# Patient Record
Sex: FEMALE | Race: WHITE | NOT HISPANIC OR LATINO | Employment: FULL TIME | URBAN - METROPOLITAN AREA
[De-identification: names, ages, dates, MRNs, and addresses within clinical notes are randomized per-mention and may not be internally consistent; named-entity substitution may affect disease eponyms.]

---

## 2019-12-18 ENCOUNTER — OFFICE VISIT (OUTPATIENT)
Dept: URGENT CARE | Facility: URGENT CARE | Age: 24
End: 2019-12-18
Payer: COMMERCIAL

## 2019-12-18 VITALS
TEMPERATURE: 98 F | RESPIRATION RATE: 16 BRPM | HEART RATE: 88 BPM | WEIGHT: 233 LBS | SYSTOLIC BLOOD PRESSURE: 118 MMHG | DIASTOLIC BLOOD PRESSURE: 78 MMHG

## 2019-12-18 DIAGNOSIS — J03.90 TONSILLITIS: ICD-10-CM

## 2019-12-18 DIAGNOSIS — J02.0 STREPTOCOCCAL PHARYNGITIS: ICD-10-CM

## 2019-12-18 DIAGNOSIS — R07.0 THROAT PAIN: Primary | ICD-10-CM

## 2019-12-18 LAB
DEPRECATED S PYO AG THROAT QL EIA: ABNORMAL
SPECIMEN SOURCE: ABNORMAL

## 2019-12-18 PROCEDURE — 99203 OFFICE O/P NEW LOW 30 MIN: CPT | Performed by: FAMILY MEDICINE

## 2019-12-18 PROCEDURE — 87880 STREP A ASSAY W/OPTIC: CPT | Performed by: PHYSICIAN ASSISTANT

## 2019-12-18 RX ORDER — AMOXICILLIN 875 MG
875 TABLET ORAL 2 TIMES DAILY
Qty: 20 TABLET | Refills: 0 | Status: SHIPPED | OUTPATIENT
Start: 2019-12-18 | End: 2020-01-16

## 2019-12-18 NOTE — PROGRESS NOTES
Chief Complaint   Patient presents with     Urgent Care     fever and sorethroat for 2 days         SUBJECTIVE:  Suyapa Pang is a 24 year old female with a chief complaint of sore throat.  Onset of symptoms was 2 day(s) ago.    Course of illness: sudden onset.  Severity moderate  Current and Associated symptoms: sore throat  Treatment measures tried include Tylenol/Ibuprofen.  Predisposing factors include recent illness .  History reviewed. No pertinent family history.    History reviewed. No pertinent past medical history.  No current outpatient medications on file.     Social History     Tobacco Use     Smoking status: Never Smoker     Smokeless tobacco: Never Used   Substance Use Topics     Alcohol use: Not on file       ROS:  10 point ROS of systems including Constitutional, Eyes, Respiratory, Cardiovascular, Gastroenterology, Genitourinary, Integumentary, Muscularskeletal, Psychiatric were all negative except for pertinent positives noted in my HPI         OBJECTIVE:   /78   Pulse 88   Temp 98  F (36.7  C) (Tympanic)   Resp 16   Wt 105.7 kg (233 lb)   GENERAL APPEARANCE: healthy, alert and no distress  EYES: EOMI,  PERRL, conjunctiva clear  HENT: ear canals and TM's normal.  Nose normal.  Pharynx erythematous with some exudate noted.  NECK: supple, non-tender to palpation, no adenopathy noted  RESP: lungs clear to auscultation - no rales, rhonchi or wheezes  CV: regular rates and rhythm, normal S1 S2, no murmur noted  ABDOMEN:  soft, nontender, no HSM or masses and bowel sounds normal  SKIN: no suspicious lesions or rashes    Rapid Strep test is positive    ASSESSMENT:  Suyapa was seen today for urgent care.    Diagnoses and all orders for this visit:    Throat pain  -     Strep, Rapid Screen    Tonsillitis    Streptococcal pharyngitis  -     amoxicillin (AMOXIL) 875 MG tablet; Take 1 tablet (875 mg) by mouth 2 times daily for 10 days        PLAN:   See orders in epic.   Symptomatic treat with  gargles, lozenges, and OTC analgesic as needed. Follow-up with primary clinic if not improving.  Advisement given that patient will be contagious for the next 24-48 hours after antibiotics initiated  Follow up if  symptoms fail to improve or worsens   Pt understood and agreed with plan     Annemarie Rogers MD

## 2020-01-16 ENCOUNTER — ANCILLARY PROCEDURE (OUTPATIENT)
Dept: GENERAL RADIOLOGY | Facility: CLINIC | Age: 25
End: 2020-01-16
Attending: PHYSICIAN ASSISTANT
Payer: COMMERCIAL

## 2020-01-16 ENCOUNTER — OFFICE VISIT (OUTPATIENT)
Dept: URGENT CARE | Facility: URGENT CARE | Age: 25
End: 2020-01-16
Payer: COMMERCIAL

## 2020-01-16 VITALS
OXYGEN SATURATION: 99 % | HEART RATE: 89 BPM | WEIGHT: 236 LBS | RESPIRATION RATE: 18 BRPM | SYSTOLIC BLOOD PRESSURE: 120 MMHG | DIASTOLIC BLOOD PRESSURE: 76 MMHG

## 2020-01-16 DIAGNOSIS — S69.91XA HAND INJURY, RIGHT, INITIAL ENCOUNTER: Primary | ICD-10-CM

## 2020-01-16 DIAGNOSIS — S60.221A CONTUSION OF RIGHT HAND, INITIAL ENCOUNTER: ICD-10-CM

## 2020-01-16 DIAGNOSIS — S69.91XA HAND INJURY, RIGHT, INITIAL ENCOUNTER: ICD-10-CM

## 2020-01-16 PROCEDURE — 73130 X-RAY EXAM OF HAND: CPT | Mod: RT

## 2020-01-16 PROCEDURE — 99214 OFFICE O/P EST MOD 30 MIN: CPT | Performed by: PHYSICIAN ASSISTANT

## 2020-01-16 RX ORDER — IBUPROFEN 800 MG/1
800 TABLET, FILM COATED ORAL EVERY 8 HOURS PRN
Qty: 30 TABLET | Refills: 0 | Status: SHIPPED | OUTPATIENT
Start: 2020-01-16 | End: 2021-01-15

## 2020-01-17 NOTE — PROGRESS NOTES
SUBJECTIVE:  Chief Complaint   Patient presents with     Musculoskeletal Problem     injury to wrist/hand today playing mickey Pang is a 24 year old female presents with a chief complaint of right hand pain and tenderness.  The injury occurred 1 day(s) ago.   The injury happened while at home. How: direct blowimmediate pain.  The patient complained of mild pain  and has had decreased ROM.  Pain exacerbated by movement.  Relieved by rest.  She treated it initially with no therapy. This is the first time this type of injury has occurred to this patient.     PMH  Obesity    ALLERGIES  No Known Allergies     FAMILY HX  Allergies  Depression    Social History     Tobacco Use     Smoking status: Never Smoker     Smokeless tobacco: Never Used   Substance Use Topics     Alcohol use: Not on file       ROS:  CONSTITUTIONAL:NEGATIVE for fever, chills, change in weight  INTEGUMENTARY/SKIN: NEGATIVE for worrisome rashes, moles or lesions  ENT/MOUTH: NEGATIVE for ear, mouth and throat problems  RESP:NEGATIVE for significant cough or SOB  CV: NEGATIVE for chest pain, palpitations or peripheral edema  GI: NEGATIVE for nausea, abdominal pain, heartburn, or change in bowel habits  MUSCULOSKELETAL: POSITIVE  for right tenderness, localized  NEURO: NEGATIVE for weakness, dizziness or paresthesias    EXAM:   /76   Pulse 89   Resp 18   Wt 107 kg (236 lb)   SpO2 99%   Gen: healthy, alert, active and healthy,alert,no distress  Extremity: hand has point tenderness .   There is not compromise to the distal circulation.  Pulses are +2 and CRT is brisk  GENERAL APPEARANCE: healthy, alert and no distress  EXTREMITIES: peripheral pulses normal  MS:  Positive for right dorsal hand tenderness  SKIN: no suspicious lesions or rashes  NEURO: Normal strength and tone, sensory exam grossly normal, mentation intact and speech normal    X-RAY was done and negative for acute changes including fracture Xray read by Michael Mohr,  DEAN at time of visit    ASSESSMENT/PLAN      ICD-10-CM    1. Hand injury, right, initial encounter S69.91XA XR Hand Right G/E 3 Views     ibuprofen (ADVIL/MOTRIN) 800 MG tablet   2. Contusion of right hand, initial encounter S60.221A ibuprofen (ADVIL/MOTRIN) 800 MG tablet       RICE treatment: Rest, Ice, compression, elevation   Motrin for inflammation  Follow up with PCP as needed

## 2020-04-10 ENCOUNTER — VIRTUAL VISIT (OUTPATIENT)
Dept: FAMILY MEDICINE | Facility: OTHER | Age: 25
End: 2020-04-10

## 2020-04-10 ENCOUNTER — ANCILLARY PROCEDURE (OUTPATIENT)
Dept: GENERAL RADIOLOGY | Facility: CLINIC | Age: 25
End: 2020-04-10
Attending: NURSE PRACTITIONER
Payer: COMMERCIAL

## 2020-04-10 ENCOUNTER — NURSE TRIAGE (OUTPATIENT)
Dept: NURSING | Facility: CLINIC | Age: 25
End: 2020-04-10

## 2020-04-10 ENCOUNTER — OFFICE VISIT (OUTPATIENT)
Dept: URGENT CARE | Facility: URGENT CARE | Age: 25
End: 2020-04-10
Payer: COMMERCIAL

## 2020-04-10 VITALS
HEART RATE: 78 BPM | RESPIRATION RATE: 20 BRPM | DIASTOLIC BLOOD PRESSURE: 80 MMHG | WEIGHT: 232 LBS | OXYGEN SATURATION: 99 % | SYSTOLIC BLOOD PRESSURE: 130 MMHG | TEMPERATURE: 98.8 F

## 2020-04-10 DIAGNOSIS — R05.8 COUGH PRESENT FOR GREATER THAN 3 WEEKS: ICD-10-CM

## 2020-04-10 DIAGNOSIS — R05.8 COUGH PRESENT FOR GREATER THAN 3 WEEKS: Primary | ICD-10-CM

## 2020-04-10 DIAGNOSIS — R06.2 WHEEZE: ICD-10-CM

## 2020-04-10 PROCEDURE — 99214 OFFICE O/P EST MOD 30 MIN: CPT | Performed by: NURSE PRACTITIONER

## 2020-04-10 PROCEDURE — 71046 X-RAY EXAM CHEST 2 VIEWS: CPT

## 2020-04-10 RX ORDER — AZITHROMYCIN 250 MG/1
TABLET, FILM COATED ORAL
Qty: 6 TABLET | Refills: 0 | Status: SHIPPED | OUTPATIENT
Start: 2020-04-10 | End: 2020-04-15

## 2020-04-10 RX ORDER — ALBUTEROL SULFATE 90 UG/1
2 AEROSOL, METERED RESPIRATORY (INHALATION) EVERY 4 HOURS PRN
Qty: 1 INHALER | Refills: 0 | Status: SHIPPED | OUTPATIENT
Start: 2020-04-10 | End: 2022-08-18

## 2020-04-10 RX ORDER — CETIRIZINE HYDROCHLORIDE 10 MG/1
10 TABLET ORAL DAILY
COMMUNITY
End: 2022-08-18

## 2020-04-10 ASSESSMENT — ENCOUNTER SYMPTOMS
PALPITATIONS: 0
VOMITING: 0
ABDOMINAL PAIN: 0
CHEST TIGHTNESS: 1
SHORTNESS OF BREATH: 0
WHEEZING: 1
HEADACHES: 1
MYALGIAS: 0
NUMBNESS: 0
RHINORRHEA: 1
LIGHT-HEADEDNESS: 0
SORE THROAT: 0
COUGH: 1
FEVER: 0
NAUSEA: 0
DIZZINESS: 0
DIARRHEA: 0
TROUBLE SWALLOWING: 0

## 2020-04-10 NOTE — TELEPHONE ENCOUNTER
"Patient called in because when she inhales she she is making a low sound like a wheezing.   First noticed this starting a few months ago after a cold. It came back about a month ago and it is every day now that she is having this noise happening with her breathing. Most of the day, all day long she hears the noise and her chest feels a little tight when the noise is really bad. She denies fever, cough, or any shortness of breath. She will do an On[facility] visit and ask for a phone call in order for the \"wheeze\" to be heard.     Azeb Jama RN on 4/10/2020 at 1:27 PM    COVID 19 Nurse Triage Plan/Patient Instructions    Please be aware that novel coronavirus (COVID-19) may be circulating in the community. If you develop symptoms such as fever, cough, or SOB or if you have concerns about the presence of another infection including coronavirus (COVID-19), please contact your health care provider or visit [email or web address].     Disposition/Instructions    Patient to have an On[facility] Visit with a provider (Preferred option). Follow System Ambulatory Workflow for COVID 19.     To do this follow these instructions:    1. Go to the website [email or web address]  2. Create an account (you will need your insurance information)  3. Start a new visit  4. Choose your diagnosis (e.g. COVID19)  5. Fill out the information about your symptoms  6. A provider will reach out to you by text, phone call or video visit based on your request    Call Back If: Your symptoms worsen before you are able to complete your On[facility] Visit with a provider.    Thank you for limiting contact with others, wearing a simple mask to cover your cough, practice good hand hygiene habits and accessing our virtual services where possible to limit the spread of this virus.    For more information about COVID19 and options for caring for yourself at home, please visit the CDC website at https://www.cdc.gov/coronavirus/2019-ncov/about/steps-when-sick.html  For more options " "for care at [facility], please visit our website at [email or web address]    For more information, please use the [place] Department of Health ([facility]) COVID-19 Hotlines (Interpreters available):     Health questions: Phone Number: [phone] or [phone] and Hours: 7 a.m. to 7 p.m.    Schools and  questions: Phone Number: [phone] or [phone] and Hours 7 a.m. to 7 p.m.    Additional Information    Negative: Breathing stopped and hasn't returned    Negative: Choking on something    Negative: SEVERE difficulty breathing (e.g., struggling for each breath, speaks in single words, pulse > 120)    Negative: Bluish (or gray) lips or face    Negative: Difficult to awaken or acting confused (e.g., disoriented, slurred speech)    Negative: Passed out (i.e., fainted, collapsed and was not responding)    Negative: Wheezing started suddenly after medicine, an allergic food, or bee sting    Negative: Stridor    Negative: Slow, shallow and weak breathing    Negative: Sounds like a life-threatening emergency to the triager    Negative: MODERATE difficulty breathing (e.g., speaks in phrases, SOB even at rest, pulse 100-120) of new onset or worse than normal    Negative: Wheezing can be heard across the room    Negative: Drooling or spitting out saliva (because can't swallow)    Negative: Any history of prior \"blood clot\" in leg or lungs    Negative: Recent illness requiring prolonged bedrest (i.e., immobilization)    Negative: Hip or leg fracture in past 2 months (e.g., or had cast on leg or ankle)    Negative: Major surgery in the past month    Negative: Recent long-distance travel with prolonged time in car, bus, plane, or train (i.e., within past 2 weeks; 6 or  more hours duration)    Negative: Extra heart beats OR irregular heart beating   (i.e., \"palpitations\")    Negative: Fever > 103 F (39.4 C)    Negative: Fever > 101 F (38.3 C) and over 60 years of age    " Negative: Fever > 100.0 F (37.8 C) and bedridden (e.g., nursing home patient, stroke, chronic illness, recovering from surgery)    Negative: Fever > 100.0 F (37.8 C) and diabetes mellitus or weak immune system (e.g., HIV positive, cancer chemo, splenectomy, organ transplant, chronic steroids)    Negative: Periods where breathing stops and then resumes normally and bedridden (e.g., nursing home patient, CVA)    Negative: Pregnant or postpartum (< 1 month since delivery)    Negative: Patient sounds very sick or weak to the triager    Patient wants to be seen    Negative: MILD difficulty breathing (e.g., minimal/no SOB at rest, SOB with walking, pulse < 100) of new onset or worse than normal    Negative: Longstanding difficulty breathing (e.g., CHF, COPD, emphysema) and worse than normal    Negative: Longstanding difficulty breathing and not responding to usual therapy    Negative: Continuous (nonstop) coughing    Protocols used: BREATHING DIFFICULTY-A-OH

## 2020-04-10 NOTE — PATIENT INSTRUCTIONS
Zithromax as prescribed  Albuterol every 4 hours as needed for cough, wheeze, or shortness of breath.  Continue with zyrtec daily  Push Fluids  Plenty of rest  Tylenol and ibuprofen to help with pain or fever  Humidified air can help with congestion  Nasal saline or Flonase nasal spray to help with congestion  Salt water gargles, anesthetic throat spray, or lozenges to help with sore throat.

## 2020-04-10 NOTE — PROGRESS NOTES
"SUBJECTIVE:   Suyapa Pang is a 24 year old female presenting with a chief complaint of   Chief Complaint   Patient presents with     Breathing Problem     sx for past few months, started after a cold, states hears a noise with resp,some chest tightness       She is an established patient of [facility].    She states has had a cough for the last 1-2 months. States did have a cold in Nov/Dec and similar thing happened then improved and then got worse again. States the cough is like a throat clearing or like a \"croaking\" feeling. She states she does have some chest tightness and wheezing as well. Denies chest pain or shortness of breath, but states feels as though she cannot breath to her full lung volume or that her body \"forgets to breath.\" She states it does not affect her activities of daily living, but does seem to be worsening. Does have history of seasonal allergies but denies history of asthma.     URI Adult    Onset of symptoms was 2 month(s) ago.  Course of illness is worsening  Severity moderate  Current and Associated symptoms: see ROS  Treatment measures tried include zyrtec.  Predisposing factors include seasonal allergies.    Review of Systems   Constitutional: Negative for fever.   HENT: Positive for congestion and rhinorrhea. Negative for ear pain, sore throat and trouble swallowing.    Eyes: Negative for visual disturbance.   Respiratory: Positive for cough, chest tightness and wheezing. Negative for shortness of breath.    Cardiovascular: Negative for chest pain and palpitations.   Gastrointestinal: Negative for abdominal pain, diarrhea, nausea and vomiting.   Musculoskeletal: Negative for myalgias.   Skin: Negative for rash.   Allergic/Immunologic: Positive for environmental allergies.   Neurological: Positive for headaches. Negative for dizziness, light-headedness and numbness.       History reviewed. No pertinent past medical history.  History reviewed. No pertinent family history.  Current " Outpatient Medications   Medication Sig Dispense Refill     albuterol (PROAIR HFA/PROVENTIL HFA/VENTOLIN HFA) 108 (90 Base) MCG/ACT inhaler Inhale 2 puffs into the lungs every 4 hours as needed for shortness of breath / dyspnea or wheezing 1 Inhaler 0     azithromycin (ZITHROMAX) 250 MG tablet Take 2 tablets (500 mg) by mouth daily for 1 day, THEN 1 tablet (250 mg) daily for 4 days. 6 tablet 0     cetirizine (ZYRTEC) 10 MG tablet Take 10 mg by mouth daily       ibuprofen (ADVIL/MOTRIN) 800 MG tablet Take 1 tablet (800 mg) by mouth every 8 hours as needed 30 tablet 0     Social History     Tobacco Use     Smoking status: Never Smoker     Smokeless tobacco: Never Used   Substance Use Topics     Alcohol use: Not on file       OBJECTIVE  /80 (Cuff Size: Adult Regular)   Pulse 78   Temp 98.8  F (37.1  C) (Oral)   Resp 20   Wt 105.2 kg (232 lb)   SpO2 99%     Physical Exam  Vitals signs and nursing note reviewed.   Constitutional:       Appearance: Normal appearance. She is well-developed and well-groomed.   HENT:      Head: Normocephalic and atraumatic.      Right Ear: Tympanic membrane, ear canal and external ear normal.      Left Ear: Tympanic membrane, ear canal and external ear normal.      Nose: Congestion and rhinorrhea present.      Right Sinus: No maxillary sinus tenderness or frontal sinus tenderness.      Left Sinus: No maxillary sinus tenderness or frontal sinus tenderness.      Mouth/Throat:      Pharynx: Oropharynx is clear. No oropharyngeal exudate or posterior oropharyngeal erythema.      Tonsils: 1+ on the right. 1+ on the left.   Eyes:      Extraocular Movements: Extraocular movements intact.      Conjunctiva/sclera: Conjunctivae normal.      Pupils: Pupils are equal, round, and reactive to light.   Neck:      Musculoskeletal: Normal range of motion and neck supple.   Cardiovascular:      Rate and Rhythm: Normal rate and regular rhythm.      Heart sounds: Normal heart sounds.   Pulmonary:       Effort: Pulmonary effort is normal.      Breath sounds: Normal air entry. Examination of the right-lower field reveals decreased breath sounds. Examination of the left-lower field reveals decreased breath sounds. Decreased breath sounds present.   Lymphadenopathy:      Head:      Right side of head: No submandibular or tonsillar adenopathy.      Left side of head: No submandibular or tonsillar adenopathy.      Cervical: No cervical adenopathy.   Skin:     General: Skin is warm and dry.   Neurological:      Mental Status: She is alert and oriented to person, place, and time.   Psychiatric:         Behavior: Behavior is cooperative.       X-Ray was done, my findings are: no acute cardiopulmonary findings.     ASSESSMENT:      ICD-10-CM    1. Cough present for greater than 3 weeks  R05 XR Chest 2 Views     azithromycin (ZITHROMAX) 250 MG tablet     albuterol (PROAIR HFA/PROVENTIL HFA/VENTOLIN HFA) 108 (90 Base) MCG/ACT inhaler   2. Wheeze  R06.2 albuterol (PROAIR HFA/PROVENTIL HFA/VENTOLIN HFA) 108 (90 Base) MCG/ACT inhaler        Medical Decision Making:    Differential Diagnosis:  URI Adult/Peds:  Allergic rhinitis, Bronchitis-viral, Bronchospasm, Laryngitis, Pneumonia, Tonsilitis and Viral upper respiratory illness    Serious Comorbid Conditions:  Adult:  None    PLAN:  Discussed with patient that will do a CXR given she does have some diminished lung sounds in the bases. CXR shows no acute cardiopulmonary findings. Discussed will treat with zithromax. Will also do albuterol every 4 hours as needed for cough, wheeze, or shortness of breath. Additional symptomatic treatment recommendations discussed. Education was added to AVS. Patient was agreeable to plan and verbalized understanding.     Followup:    If not improving in 1 week or if condition worsens, follow up with your Primary Care Provider    Patient Instructions   Zithromax as prescribed  Albuterol every 4 hours as needed for cough, wheeze, or shortness of  breath.  Continue with zyrtec daily  Push Fluids  Plenty of rest  Tylenol and ibuprofen to help with pain or fever  Humidified air can help with congestion  Nasal saline or Flonase nasal spray to help with congestion  Salt water gargles, anesthetic throat spray, or lozenges to help with sore throat.

## 2020-04-10 NOTE — PROGRESS NOTES
"Date: 04/10/2020 14:34:51  Clinician: Michael Contreras  Clinician NPI: [id number]  Patient: Suyapa Pang  Patient : 1995  Patient Address: [address] [place] [address] [address], [place] [id number]  Patient Phone: [phone]  Visit Protocol: Allergic rhinitis  Patient Summary:  Suyapa is a 24 year old ( : 1995 ) female who initiated a Visit for evaluation of seasonal allergies.  When asked the question \"Please sign me up to receive news, health information and promotions. \", Suyapa responded \"No\".   A synchronous visit is necessary because the patient reported the following abnormal symptoms:   Symptoms noted are not consistent with allergies   She is currently experiencing allergy symptoms. She has NOT been treated by a health care provider in the past for these symptoms. She notes headache and coughing with cough as the most bothersome.  These symptoms have been present for several months.   Suyapa is experiencing mild difficulty breathing with activities but can speak normally in full sentences. Her wheezing is moderate and it doesn't interfere with daily activities.  Dust aggravate these symptoms.   Suyapa has not received allergy shots. The patient's allergy symptoms have NOT been confirmed by skin allergy testing. Family history of allergies is unknown.   Suyapa denies tooth pain, feeling feverish, facial pressure, scratchy throat, plugged ears, having nasal ulcers, a septum deviation or other sinus wounds, runny nose, sneezing, and itchy eyes.   She denies smoking or using smokeless tobacco.   PREVIOUS ALLERGY MEDICATIONS:   Cetirizine (Zyrtec); NOT effective   She denies pregnancy and denies breastfeeding. She has menstruated in the past month.   Additional information as reported by the patient (free text): Wheezing when I breathe in sounds like low croaking.  It has been consistently happening for a month now.  Happened before after a cold a few months ago but it went away for a bit " until a month ago. It happens throughout the day daily. At the worst I have some chest tightness and it feels as if my body forgets how to breathe. At times it doesn't feel like I am using my full lung compacity when I breathe like normal.     MEDICATIONS: Zyrtec oral, ALLERGIES: NKDA  Clinician Response:  Dear Suyapa,   Based on the information you have provided, further evaluation in urgent care is indicated. You may be seen in one of our designated urgent care sites that is prepared to see patients who have symptoms that could indicate Coronavirus (Covid-19).   For your information: At this time we will NOT perform coronavirus testing in urgent care sites. This test is currently being reserved for patients who are being admitted to the hospital.  [facility] Locations: [place], [place], [place], [place], [place], [facility] [place] and [place]  Please call [address] [place] [address] ([phone]) to schedule an urgent care appointment at one of our urgent care or walk in care sites. It is essential that you tell them when you call that you were referred to be scheduled for IN-PERSON urgent care appointment by a provider in [facility].      [facility]: If you usually get care or are located in the [facility] area, you can be seen as a walk in without an appointment at the [facility]. This is open from 8AM-8PM on weekdays and 10am-8pm on weekends. The phone number to this clinic is [phone].     PLEASE BRING DOCUMENTATION FROM THIS COMPLETED [facility]Care VISIT TO YOUR URGENT CARE VISIT.     For more information about COVID19 and options for caring for yourself at home, please visit the CDC website at https://www.cdc.gov/coronavirus/2019-ncov/about/steps-when-sick.html  For more options for care at [facility], please visit our website at [email or web address]    Diagnosis: Cough  Diagnosis ICD: R05  Triage Notes: I reviewed the patient's  "history, verified their identity, and explained the Visit process.    Called to discuss symptoms with patient. She states has had a cough for the last 1-2 months. States did have a cold in Nov/Dec and similar thing happened then improved and then got worse again. States the cough is like a throat clearing or like a \"croaking\" feeling. She states she does have some chest tightness and wheezing as well. Denies chest pain or shortness of breath, but states feels as though she cannot breath to her full lung volume or that her body \"forgets to breath.\" She states it does not affect her activities of daily living, but does seem to be worsening. She states she does still have her tonsils, they do not seem to be inflamed. She denies sore throat or difficulty swallowing. She does get intermittent headaches, but denies dizziness, lightheaded, vision changes. She denies nausea, vomiting, diarrhea, or rash. She does have history of seasonal allergies but denies history of asthma. Patient is able to speak in full sentences without difficulty. Recommend that patient be seen in clinic for evaluation. Instructed to call [phone] to get set up with an IN-Person visit.  Synchronous Triage: phone, status: completed, duration: 386 seconds  "

## 2022-08-18 ENCOUNTER — OFFICE VISIT (OUTPATIENT)
Dept: URGENT CARE | Facility: URGENT CARE | Age: 27
End: 2022-08-18
Payer: COMMERCIAL

## 2022-08-18 VITALS
HEART RATE: 80 BPM | DIASTOLIC BLOOD PRESSURE: 89 MMHG | RESPIRATION RATE: 18 BRPM | TEMPERATURE: 98.1 F | WEIGHT: 233 LBS | OXYGEN SATURATION: 99 % | SYSTOLIC BLOOD PRESSURE: 131 MMHG

## 2022-08-18 DIAGNOSIS — H66.91 OTITIS MEDIA WITH RUPTURE OF TYMPANIC MEMBRANE, RIGHT: ICD-10-CM

## 2022-08-18 DIAGNOSIS — H65.192 OTHER NON-RECURRENT ACUTE NONSUPPURATIVE OTITIS MEDIA OF LEFT EAR: Primary | ICD-10-CM

## 2022-08-18 DIAGNOSIS — H72.91 OTITIS MEDIA WITH RUPTURE OF TYMPANIC MEMBRANE, RIGHT: ICD-10-CM

## 2022-08-18 PROCEDURE — 99213 OFFICE O/P EST LOW 20 MIN: CPT | Performed by: NURSE PRACTITIONER

## 2022-08-18 RX ORDER — NEOMYCIN SULFATE, POLYMYXIN B SULFATE, HYDROCORTISONE 3.5; 10000; 1 MG/ML; [USP'U]/ML; MG/ML
3 SOLUTION/ DROPS AURICULAR (OTIC) 4 TIMES DAILY
Qty: 5 ML | Refills: 0 | Status: SHIPPED | OUTPATIENT
Start: 2022-08-18 | End: 2022-08-25

## 2022-08-18 RX ORDER — AMOXICILLIN 500 MG/1
1000 CAPSULE ORAL 2 TIMES DAILY
Qty: 40 CAPSULE | Refills: 0 | Status: SHIPPED | OUTPATIENT
Start: 2022-08-18 | End: 2022-08-28

## 2022-08-18 NOTE — PROGRESS NOTES
Chief Complaint   Patient presents with     Urgent Care     Intense ear pain, red liquid drainage          ICD-10-CM    1. Other non-recurrent acute nonsuppurative otitis media of left ear  H65.192 amoxicillin (AMOXIL) 500 MG capsule   2. Otitis media with rupture of tympanic membrane, right  H66.91 neomycin-polymyxin-hydrocortisone (CORTISPORIN) 3.5-12703-4 otic solution    H72.91    Will treat the tympanic membrane rupture with antibiotic drops and the otitis media on the other side with amoxicillin.  Recheck in 10 days if symptoms not completely resolved.  Recheck here in 1 month.  No submerging head in water until eardrum has sealed shut and you have been cleared.    Subjective     Suyapa Pang is an 26 year old female who presents to clinic today for right ear pain that started today and then it started draining today. She has been having some nasal congestion for a couple of days.      ROS: 10 point ROS neg other than the symptoms noted above in the HPI.       Objective    /89   Pulse 80   Temp 98.1  F (36.7  C)   Resp 18   Wt 105.7 kg (233 lb)   SpO2 99%   Nurses notes and VS have been reviewed.    Physical Exam       GENERAL APPEARANCE: alert and mild distress     EYES: PERRL, EOMI, sclera non-icteric     HENT: nose and mouth without ulcers or lesions and left ear canal appears normal, tympanic membrane appears red and bulging, right tympanic membrane is red with rupture, canal has thick serosanguineous fluid present     NECK: no adenopathy or asymmetry, thyroid normal to palpation     RESP: lungs clear to auscultation - no rales, rhonchi or wheezes     CV: regular rates and rhythm, no murmurs, rubs, or gallop     MS: extremities normal- no gross deformities noted; normal muscle tone.     SKIN: no suspicious lesions or rashes     NEURO: Normal strength and tone, mentation intact and speech normal     PSYCH: normal thought process; no significant mood disturbance    Patient Instructions   Take  all medications as prescribed.    Recheck eardrum in one month.    No submerging your head in water until rechecked.      JOLENE Ladd, CNP  [facility] Provider    The use of [employer] dictation services may have been used to construct the content in this note; any grammatical or spelling errors are non-intentional. Please contact the author of this note directly if you are in need of any clarification.

## 2022-08-18 NOTE — PATIENT INSTRUCTIONS
Take all medications as prescribed.    Recheck eardrum in one month.    No submerging your head in water until rechecked.

## 2022-08-19 ENCOUNTER — NURSE TRIAGE (OUTPATIENT)
Dept: NURSING | Facility: CLINIC | Age: 27
End: 2022-08-19

## 2022-08-19 NOTE — TELEPHONE ENCOUNTER
Yesterday had ear drum ruptured, double ear infection. Thinks the other one ruptured and hearing is muffled.  Left ear is ruptured as well. Should she put drops in both ears? I explained the volume of the ear drops may not be enough to treat both ears so she should use it in the right ear and take the oral antibiotics which will still work on the infection. As the ear heals, the muffled sound should improve. If not improved in ten days, she should seek out care again, as directed in visit notes.  Katia Griffin RN  [facility]    Reason for Disposition    Caller has medicine question, adult has minor symptoms, caller declines triage, AND triager answers question    Additional Information    Negative: [1] Intentional drug overdose AND [2] suicidal thoughts or ideas    Negative: Drug overdose and triager unable to answer question    Negative: Caller requesting a renewal or refill of a medicine patient is currently taking    Negative: Caller requesting information unrelated to medicine    Negative: Caller requesting information about COVID-19 Vaccine    Negative: Caller requesting information about Emergency Contraception    Negative: Caller requesting information about Combined Birth Control Pills    Negative: Caller requesting information about Progestin Birth Control Pills    Negative: Caller requesting information about Post-Op pain or medicines    Negative: Caller requesting a prescription antibiotic (such as Penicillin) for Strep throat and has a positive culture result    Negative: Caller requesting a prescription anti-viral med (such as Tamiflu) and has influenza (flu) symptoms    Negative: Immunization reaction suspected    Negative: Rash while taking a medicine or within 3 days of stopping it    Negative: [1] Asthma and [2] having symptoms of asthma (cough, wheezing, etc.)    Negative: [1] Symptom of illness (e.g., headache, abdominal pain, earache, vomiting) AND [2] more than mild    Negative:  Breastfeeding questions about mother's medicines and diet    Negative: MORE THAN A DOUBLE DOSE of a prescription or over-the-counter (OTC) drug    Negative: [1] DOUBLE DOSE (an extra dose or lesser amount) of prescription drug AND [2] any symptoms (e.g., dizziness, nausea, pain, sleepiness)    Negative: [1] DOUBLE DOSE (an extra dose or lesser amount) of over-the-counter (OTC) drug AND [2] any symptoms (e.g., dizziness, nausea, pain, sleepiness)    Negative: Took another person's prescription drug    Negative: [1] DOUBLE DOSE (an extra dose or lesser amount) of prescription drug AND [2] NO symptoms (Exception: a double dose of antibiotics)    Negative: Diabetes drug error or overdose (e.g., took wrong type of insulin or took extra dose)    Negative: [1] Prescription not at pharmacy AND [2] was prescribed by PCP recently (Exception: triager has access to EMR and prescription is recorded there. Go to Home Care and confirm for pharmacy.)    Negative: [1] Pharmacy calling with prescription question AND [2] triager unable to answer question    Negative: [1] Caller has URGENT medicine question about med that PCP or specialist prescribed AND [2] triager unable to answer question    Negative: Medicine patch causing local rash or itching    Negative: [1] Caller has medicine question about med NOT prescribed by PCP AND [2] triager unable to answer question (e.g., compatibility with other med, storage)    Negative: Prescription request for new medicine (not a refill)    Negative: [1] Caller has NON-URGENT medicine question about med that PCP prescribed AND [2] triager unable to answer question    Negative: Caller wants to use a complementary or alternative medicine    Negative: [1] Prescription prescribed recently is not at pharmacy AND [2] triager has access to patient's EMR AND [3] prescription is recorded in the EMR    Negative: [1] DOUBLE DOSE (an extra dose or lesser amount) of over-the-counter (OTC) drug AND [2] NO  symptoms    Negative: [1] DOUBLE DOSE (an extra dose or lesser amount) of antibiotic drug AND [2] NO symptoms    Negative: Caller has medicine question only, adult not sick, AND triager answers question    Protocols used: MEDICATION QUESTION CALL-A-AH

## 2022-08-26 ENCOUNTER — TELEPHONE (OUTPATIENT)
Dept: FAMILY MEDICINE | Facility: CLINIC | Age: 27
End: 2022-08-26

## 2022-08-26 ENCOUNTER — OFFICE VISIT (OUTPATIENT)
Dept: FAMILY MEDICINE | Facility: CLINIC | Age: 27
End: 2022-08-26
Payer: COMMERCIAL

## 2022-08-26 VITALS
HEART RATE: 81 BPM | HEIGHT: 67 IN | SYSTOLIC BLOOD PRESSURE: 122 MMHG | RESPIRATION RATE: 16 BRPM | OXYGEN SATURATION: 99 % | WEIGHT: 238 LBS | BODY MASS INDEX: 37.35 KG/M2 | DIASTOLIC BLOOD PRESSURE: 66 MMHG | TEMPERATURE: 98.8 F

## 2022-08-26 DIAGNOSIS — Z23 NEED FOR TDAP VACCINATION: ICD-10-CM

## 2022-08-26 DIAGNOSIS — H65.93 MIDDLE EAR EFFUSION, BILATERAL: Primary | ICD-10-CM

## 2022-08-26 PROCEDURE — 90471 IMMUNIZATION ADMIN: CPT | Performed by: FAMILY MEDICINE

## 2022-08-26 PROCEDURE — 90715 TDAP VACCINE 7 YRS/> IM: CPT | Performed by: FAMILY MEDICINE

## 2022-08-26 PROCEDURE — 99213 OFFICE O/P EST LOW 20 MIN: CPT | Mod: 25 | Performed by: FAMILY MEDICINE

## 2022-08-26 ASSESSMENT — PAIN SCALES - GENERAL: PAINLEVEL: NO PAIN (0)

## 2022-08-26 NOTE — PATIENT INSTRUCTIONS
For your congestion I recommend the followin. Use afrin nasal spray twice a day for up to 3 days. This helps open up the sinuses. Should not be used more than 3 days in a row every once in a while.  2. Use flonase/nasacort nasal spray 1-2x a day during seasons when you are more prone to allergies.  3. Take zyrtec every day during allergy seasons. It works best when you take it regularly. You can take extra (such as 2-3 tablets per day) if needed, when your allergies are worse.  4. Consider getting a neti pot or other nasal wash system (they come in teapot styles and squeeze bottle styles). It's important to always use previously filtered or boiled water (or bottled drinking water) to avoid a very rare but dangerous type of water-borne infection. Follow the instructions that come with the product. You can do this approximately once per day, or however often you need for relief.    Environmental management meaning dust in the home. Textiles, carpets, air filters, etc.

## 2022-08-26 NOTE — TELEPHONE ENCOUNTER
I left a message for her to call us back.     We just noticed that she has an appointment with  regarding an IUD and another appointment right afterwards with  for her physical.      She cannot see 2 different family practice providers in the same day.  Insurance will not cover/allow it.     She will need to choose 1 appointment for today and reschedule the other.      also wants to know when her current IUD was placed, what type she currently has, and when it is due to be removed.  We don't have prior records in her chart regarding IUD's.       Bonnie Barnett CMA 8/26/2022 12:57 PM

## 2022-08-26 NOTE — PROGRESS NOTES
"  Assessment & Plan     Need for Tdap vaccination  given    Middle ear effusion, bilateral  Reassurance given see below    Patient Instructions   For your congestion I recommend the followin. Use afrin nasal spray twice a day for up to 3 days. This helps open up the sinuses. Should not be used more than 3 days in a row every once in a while.  2. Use flonase/nasacort nasal spray 1-2x a day during seasons when you are more prone to allergies.  3. Take zyrtec every day during allergy seasons. It works best when you take it regularly. You can take extra (such as 2-3 tablets per day) if needed, when your allergies are worse.  4. Consider getting a neti pot or other nasal wash system (they come in teapot styles and squeeze bottle styles). It's important to always use previously filtered or boiled water (or bottled drinking water) to avoid a very rare but dangerous type of water-borne infection. Follow the instructions that come with the product. You can do this approximately once per day, or however often you need for relief.    Environmental management meaning dust in the home. Textiles, carpets, air filters, etc.        Return if symptoms worsen or fail to improve.    Zora Babb MD  [facility]   Suyapa is a 26 year old, presenting for the following health issues:  IUD (Patient currently has a Mirena. Placed 5 years ago. )      HPI     Eardrum ruptured last week - right side  Then Thurs night on her L ear had similar symptoms so she would like it checked  But otherwise well    Review of Systems   Constitutional, HEENT, cardiovascular, pulmonary, gi and gu systems are negative, except as otherwise noted.      Objective    /66   Pulse 81   Temp 98.8  F (37.1  C) (Tympanic)   Resp 16   Ht 1.702 m (5' 7\")   Wt 108 kg (238 lb)   SpO2 99%   Breastfeeding No   BMI 37.28 kg/m    Body mass index is 37.28 kg/m .  Physical Exam   GENERAL: healthy, alert and no " distress  EYES: Eyes grossly normal to inspection, conjunctivae and sclerae normal  FACE: non-tender to palpation over maxillary sinuses  HENT: ear canals normal, bilateral middle ear effusions with retracted TMs but no rupture seen, boggy turbinates in nose, and mouth without ulcers or lesions   NECK: no adenopathy, no asymmetry, masses, or scars and thyroid normal to palpation  RESP: normal respiratory effort, speaking in complete sentences  MS: no gross musculoskeletal defects noted, no edema  PSYCH: mentation appears normal, affect normal/bright                .  ..

## 2022-09-18 ENCOUNTER — HEALTH MAINTENANCE LETTER (OUTPATIENT)
Age: 27
End: 2022-09-18

## 2022-09-19 ENCOUNTER — OFFICE VISIT (OUTPATIENT)
Dept: FAMILY MEDICINE | Facility: CLINIC | Age: 27
End: 2022-09-19
Payer: COMMERCIAL

## 2022-09-19 VITALS
HEART RATE: 76 BPM | WEIGHT: 240.8 LBS | RESPIRATION RATE: 16 BRPM | HEIGHT: 67 IN | BODY MASS INDEX: 37.79 KG/M2 | DIASTOLIC BLOOD PRESSURE: 81 MMHG | OXYGEN SATURATION: 97 % | SYSTOLIC BLOOD PRESSURE: 122 MMHG | TEMPERATURE: 96.8 F

## 2022-09-19 DIAGNOSIS — Z86.69 HISTORY OF ACUTE OTITIS MEDIA: Primary | ICD-10-CM

## 2022-09-19 PROCEDURE — 99212 OFFICE O/P EST SF 10 MIN: CPT | Performed by: NURSE PRACTITIONER

## 2022-09-19 ASSESSMENT — PAIN SCALES - GENERAL: PAINLEVEL: NO PAIN (0)

## 2022-09-19 NOTE — PROGRESS NOTES
"  Assessment & Plan   Problem List Items Addressed This Visit    None     Visit Diagnoses     History of acute otitis media    -  Primary           Symptoms resolved. Normal TM exam today     Lemuel Zaman, JOLENE CNP  [facility] NORMA Dale is a 27 year old, presenting for the following health issues:  Ear Problem      HPI     Recent AOM with potential rupture. Here for recheck   No complaints today. Symptoms have resolved       Review of Systems   Detailed as above         Objective    /81 (BP Location: Right arm, Patient Position: Sitting, Cuff Size: Adult Large)   Pulse 76   Temp 96.8  F (36  C) (Temporal)   Resp 16   Ht 1.702 m (5' 7\")   Wt 109.2 kg (240 lb 12.8 oz)   SpO2 97%   Breastfeeding No   BMI 37.71 kg/m    Body mass index is 37.71 kg/m .  Physical Exam  Constitutional:       Appearance: Normal appearance.   HENT:      Right Ear: Tympanic membrane, ear canal and external ear normal.      Left Ear: Tympanic membrane, ear canal and external ear normal.   Pulmonary:      Effort: Pulmonary effort is normal.   Neurological:      Mental Status: She is alert.   Psychiatric:         Mood and Affect: Mood normal.                    "

## 2022-12-07 ENCOUNTER — TELEPHONE (OUTPATIENT)
Dept: FAMILY MEDICINE | Facility: CLINIC | Age: 27
End: 2022-12-07

## 2023-10-08 ENCOUNTER — HEALTH MAINTENANCE LETTER (OUTPATIENT)
Age: 28
End: 2023-10-08

## 2023-11-08 ENCOUNTER — OFFICE VISIT (OUTPATIENT)
Dept: FAMILY MEDICINE | Facility: CLINIC | Age: 28
End: 2023-11-08
Payer: COMMERCIAL

## 2023-11-08 VITALS
OXYGEN SATURATION: 97 % | HEART RATE: 98 BPM | WEIGHT: 254 LBS | DIASTOLIC BLOOD PRESSURE: 68 MMHG | RESPIRATION RATE: 16 BRPM | SYSTOLIC BLOOD PRESSURE: 130 MMHG | HEIGHT: 67 IN | BODY MASS INDEX: 39.87 KG/M2 | TEMPERATURE: 98 F

## 2023-11-08 DIAGNOSIS — Z11.3 ROUTINE SCREENING FOR STI (SEXUALLY TRANSMITTED INFECTION): ICD-10-CM

## 2023-11-08 DIAGNOSIS — Z12.4 CERVICAL CANCER SCREENING: ICD-10-CM

## 2023-11-08 DIAGNOSIS — Z00.00 ROUTINE GENERAL MEDICAL EXAMINATION AT A HEALTH CARE FACILITY: Primary | ICD-10-CM

## 2023-11-08 PROCEDURE — 87491 CHLMYD TRACH DNA AMP PROBE: CPT | Performed by: FAMILY MEDICINE

## 2023-11-08 PROCEDURE — 99395 PREV VISIT EST AGE 18-39: CPT | Performed by: FAMILY MEDICINE

## 2023-11-08 PROCEDURE — 87591 N.GONORRHOEAE DNA AMP PROB: CPT | Performed by: FAMILY MEDICINE

## 2023-11-08 PROCEDURE — G0145 SCR C/V CYTO,THINLAYER,RESCR: HCPCS | Performed by: FAMILY MEDICINE

## 2023-11-08 ASSESSMENT — ENCOUNTER SYMPTOMS
ABDOMINAL PAIN: 0
PALPITATIONS: 0
SHORTNESS OF BREATH: 0
DIARRHEA: 0
NAUSEA: 0
DIZZINESS: 0
CONSTIPATION: 0
DYSURIA: 0
FEVER: 0
JOINT SWELLING: 0
BREAST MASS: 0
SORE THROAT: 0
COUGH: 0
WEAKNESS: 0
MYALGIAS: 0
CHILLS: 0
FREQUENCY: 0
HEARTBURN: 0
EYE PAIN: 0
HEMATOCHEZIA: 0
PARESTHESIAS: 0
HEMATURIA: 0
ARTHRALGIAS: 0
NERVOUS/ANXIOUS: 0
HEADACHES: 0

## 2023-11-08 NOTE — PROGRESS NOTES
SUBJECTIVE:   CC: Suyapa is an 28 year old who presents for preventive health visit.       2023     4:02 PM   Additional Questions   Roomed by Urmila       Healthy Habits:     Getting at least 3 servings of Calcium per day:  Yes    Bi-annual eye exam:  NO    Dental care twice a year:  Yes    Sleep apnea or symptoms of sleep apnea:  None    Diet:  Regular (no restrictions)    Frequency of exercise:  2-3 days/week    Duration of exercise:  15-30 minutes    Taking medications regularly:  Yes    Medication side effects:  None    Additional concerns today:  No      Today's PHQ-2 Score:       2023     3:51 PM   PHQ-2 (  [employer])   Q1: Little interest or pleasure in doing things 0   Q2: Feeling down, depressed or hopeless 0   PHQ-2 Score 0   Q1: Little interest or pleasure in doing things Not at all   Q2: Feeling down, depressed or hopeless Not at all   PHQ-2 Score 0     Concussion in August   Hit a wood panel in on a wall   Doesn't know if she lost consciousness   Felt poor for a couple weeks  No symptoms now           Have you ever done Advance Care Planning? (For example, a Health Directive, POLST, or a discussion with a medical provider or your loved ones about your wishes): No, advance care planning information given to patient to review.  Patient plans to discuss their wishes with loved ones or provider.      Social History     Tobacco Use    Smoking status: Never    Smokeless tobacco: Never   Substance Use Topics    Alcohol use: Yes     Comment: rare             2023     3:51 PM   Alcohol Use   Prescreen: >3 drinks/day or >7 drinks/week? No          No data to display              Reviewed orders with patient.  Reviewed health maintenance and updated orders accordingly - Yes    Breast Cancer Screenin/8/2023     3:53 PM   Breast CA Risk Assessment (FHS-7)   Do you have a family history of breast, colon, or ovarian cancer? No / Unknown     Pertinent mammograms are reviewed under the  "imaging tab.    History of abnormal Pap smear: NO - age 21-29 PAP every 3 years recommended     Reviewed and updated as needed this visit by clinical staff   Tobacco  Allergies  Meds   Med Hx  Surg Hx  Fam Hx  Soc Hx        Reviewed and updated as needed this visit by Provider   Tobacco     Med Hx  Surg Hx  Fam Hx  Soc Hx         Review of Systems   Constitutional:  Negative for chills and fever.   HENT:  Positive for congestion. Negative for ear pain, hearing loss and sore throat.    Eyes:  Negative for pain and visual disturbance.   Respiratory:  Negative for cough and shortness of breath.    Cardiovascular:  Negative for chest pain, palpitations and peripheral edema.   Gastrointestinal:  Negative for abdominal pain, constipation, diarrhea, heartburn, hematochezia and nausea.   Breasts:  Negative for tenderness, breast mass and discharge.   Genitourinary:  Negative for dysuria, frequency, genital sores, hematuria, pelvic pain, urgency, vaginal bleeding and vaginal discharge.   Musculoskeletal:  Negative for arthralgias, joint swelling and myalgias.   Skin:  Negative for rash.   Neurological:  Negative for dizziness, weakness, headaches and paresthesias.   Psychiatric/Behavioral:  Negative for mood changes. The patient is not nervous/anxious.         OBJECTIVE:   /68 (BP Location: Right arm, Patient Position: Sitting, Cuff Size: Adult Large)   Pulse 98   Temp 98  F (36.7  C) (Temporal)   Resp 16   Ht 1.709 m (5' 7.28\")   Wt 115.2 kg (254 lb)   LMP  (LMP Unknown)   SpO2 97%   BMI 39.45 kg/m      Physical Exam  Constitutional:       General: She is not in acute distress.  HENT:      Head: Normocephalic.      Right Ear: Tympanic membrane normal.      Left Ear: Tympanic membrane normal.      Mouth/Throat:      Mouth: Mucous membranes are moist.      Pharynx: Oropharynx is clear.   Eyes:      General: No scleral icterus.  Cardiovascular:      Rate and Rhythm: Normal rate and regular rhythm.      " "Heart sounds: Normal heart sounds.   Pulmonary:      Effort: No respiratory distress.      Breath sounds: Normal breath sounds.   Abdominal:      General: Abdomen is flat. Bowel sounds are normal.      Palpations: Abdomen is soft.   Genitourinary:     General: Normal vulva.      Vagina: No vaginal discharge.   Musculoskeletal:      Cervical back: No tenderness.      Right lower leg: No edema.      Left lower leg: No edema.   Lymphadenopathy:      Cervical: No cervical adenopathy.   Skin:     General: Skin is warm.   Neurological:      General: No focal deficit present.      Mental Status: She is alert.   Psychiatric:         Mood and Affect: Mood normal.         Behavior: Behavior normal.         ASSESSMENT/PLAN:       ICD-10-CM    1. Routine general medical examination at a health care facility  Z00.00 REVIEW OF HEALTH MAINTENANCE PROTOCOL ORDERS      2. Cervical cancer screening  Z12.4 Pap Screen reflex to HPV if ASCUS - recommend age 25 - 29     HPV Hold (Lab Only)      3. Routine screening for STI (sexually transmitted infection)  Z11.3 Chlamydia trachomatis/Neisseria gonorrhoeae by PCR     Chlamydia trachomatis/Neisseria gonorrhoeae by PCR     CANCELED: Chlamydia trachomatis/Neisseria gonorrhoeae by PCR - Clinic Collect          Patient has been advised of split billing requirements and indicates understanding: No      COUNSELING:  Reviewed preventive health counseling, as reflected in patient instructions      BMI:   Estimated body mass index is 39.45 kg/m  as calculated from the following:    Height as of this encounter: 1.709 m (5' 7.28\").    Weight as of this encounter: 115.2 kg (254 lb).   Weight management plan: Discussed healthy diet and exercise guidelines      She reports that she has never smoked. She has never used smokeless tobacco.          Phan West DO  [facility]  Answers submitted by the patient for this visit:  Annual Preventive Visit (Submitted on 11/8/2023)  Chief " Complaint: Annual Exam:  Frequency of exercise:: 2-3 days/week  Getting at least 3 servings of Calcium per day:: Yes  Diet:: Regular (no restrictions)  Taking medications regularly:: Yes  Medication side effects:: None  Bi-annual eye exam:: NO  Dental care twice a year:: Yes  Sleep apnea or symptoms of sleep apnea:: None  abdominal pain: No  Blood in stool: No  Blood in urine: No  chest pain: No  chills: No  congestion: Yes  constipation: No  cough: No  diarrhea: No  dizziness: No  ear pain: No  eye pain: No  nervous/anxious: No  fever: No  frequency: No  genital sores: No  headaches: No  hearing loss: No  heartburn: No  arthralgias: No  joint swelling: No  peripheral edema: No  mood changes: No  myalgias: No  nausea: No  dysuria: No  palpitations: No  Skin sensation changes: No  sore throat: No  urgency: No  rash: No  shortness of breath: No  visual disturbance: No  weakness: No  pelvic pain: No  vaginal bleeding: No  vaginal discharge: No  tenderness: No  breast mass: No  breast discharge: No  Additional concerns today:: No  Exercise outside of work (Submitted on 11/8/2023)  Chief Complaint: Annual Exam:  Duration of exercise:: 15-30 minutes

## 2023-11-09 LAB
C TRACH DNA SPEC QL PROBE+SIG AMP: NEGATIVE
N GONORRHOEA DNA SPEC QL NAA+PROBE: NEGATIVE

## 2023-11-13 LAB
BKR LAB AP GYN ADEQUACY: NORMAL
BKR LAB AP GYN INTERPRETATION: NORMAL
BKR LAB AP HPV REFLEX: NORMAL
BKR LAB AP LMP: NORMAL
BKR LAB AP PREVIOUS ABNORMAL: NORMAL
PATH REPORT.COMMENTS IMP SPEC: NORMAL
PATH REPORT.COMMENTS IMP SPEC: NORMAL
PATH REPORT.RELEVANT HX SPEC: NORMAL

## 2024-10-09 ENCOUNTER — PATIENT OUTREACH (OUTPATIENT)
Dept: CARE COORDINATION | Facility: CLINIC | Age: 29
End: 2024-10-09
Payer: COMMERCIAL

## 2024-10-23 ENCOUNTER — PATIENT OUTREACH (OUTPATIENT)
Dept: CARE COORDINATION | Facility: CLINIC | Age: 29
End: 2024-10-23
Payer: COMMERCIAL

## 2024-12-03 SDOH — HEALTH STABILITY: PHYSICAL HEALTH: ON AVERAGE, HOW MANY DAYS PER WEEK DO YOU ENGAGE IN MODERATE TO STRENUOUS EXERCISE (LIKE A BRISK WALK)?: 3 DAYS

## 2024-12-03 SDOH — HEALTH STABILITY: PHYSICAL HEALTH: ON AVERAGE, HOW MANY MINUTES DO YOU ENGAGE IN EXERCISE AT THIS LEVEL?: 30 MIN

## 2024-12-03 ASSESSMENT — SOCIAL DETERMINANTS OF HEALTH (SDOH): HOW OFTEN DO YOU GET TOGETHER WITH FRIENDS OR RELATIVES?: TWICE A WEEK

## 2024-12-04 ENCOUNTER — OFFICE VISIT (OUTPATIENT)
Dept: FAMILY MEDICINE | Facility: CLINIC | Age: 29
End: 2024-12-04
Attending: FAMILY MEDICINE
Payer: COMMERCIAL

## 2024-12-04 VITALS
WEIGHT: 248.5 LBS | DIASTOLIC BLOOD PRESSURE: 89 MMHG | SYSTOLIC BLOOD PRESSURE: 145 MMHG | TEMPERATURE: 98.1 F | RESPIRATION RATE: 16 BRPM | BODY MASS INDEX: 37.66 KG/M2 | HEIGHT: 68 IN | HEART RATE: 98 BPM | OXYGEN SATURATION: 99 %

## 2024-12-04 DIAGNOSIS — Z30.431 IUD CHECK UP: ICD-10-CM

## 2024-12-04 DIAGNOSIS — Z11.3 ROUTINE SCREENING FOR STI (SEXUALLY TRANSMITTED INFECTION): ICD-10-CM

## 2024-12-04 DIAGNOSIS — Z13.1 SCREENING FOR DIABETES MELLITUS: ICD-10-CM

## 2024-12-04 DIAGNOSIS — M25.551 HIP PAIN, RIGHT: ICD-10-CM

## 2024-12-04 DIAGNOSIS — Z11.59 NEED FOR HEPATITIS C SCREENING TEST: ICD-10-CM

## 2024-12-04 DIAGNOSIS — Z00.00 ROUTINE GENERAL MEDICAL EXAMINATION AT A HEALTH CARE FACILITY: Primary | ICD-10-CM

## 2024-12-04 DIAGNOSIS — Z13.220 LIPID SCREENING: ICD-10-CM

## 2024-12-04 DIAGNOSIS — Z23 NEED FOR VACCINATION: ICD-10-CM

## 2024-12-04 LAB
EST. AVERAGE GLUCOSE BLD GHB EST-MCNC: 94 MG/DL
HBA1C MFR BLD: 4.9 % (ref 0–5.6)
T PALLIDUM AB SER QL: NONREACTIVE

## 2024-12-04 PROCEDURE — 86803 HEPATITIS C AB TEST: CPT | Performed by: FAMILY MEDICINE

## 2024-12-04 PROCEDURE — 87591 N.GONORRHOEAE DNA AMP PROB: CPT | Performed by: FAMILY MEDICINE

## 2024-12-04 PROCEDURE — 87491 CHLMYD TRACH DNA AMP PROBE: CPT | Performed by: FAMILY MEDICINE

## 2024-12-04 PROCEDURE — 86780 TREPONEMA PALLIDUM: CPT | Performed by: FAMILY MEDICINE

## 2024-12-04 PROCEDURE — 36415 COLL VENOUS BLD VENIPUNCTURE: CPT | Performed by: FAMILY MEDICINE

## 2024-12-04 PROCEDURE — 83036 HEMOGLOBIN GLYCOSYLATED A1C: CPT | Performed by: FAMILY MEDICINE

## 2024-12-04 PROCEDURE — 80061 LIPID PANEL: CPT | Performed by: FAMILY MEDICINE

## 2024-12-04 PROCEDURE — 87389 HIV-1 AG W/HIV-1&-2 AB AG IA: CPT | Performed by: FAMILY MEDICINE

## 2024-12-04 ASSESSMENT — PAIN SCALES - GENERAL: PAINLEVEL_OUTOF10: NO PAIN (0)

## 2024-12-04 NOTE — PATIENT INSTRUCTIONS
Patient Education   Preventive Care Advice   This is general advice given by our system to help you stay healthy. However, your care team may have specific advice just for you. Please talk to your care team about your preventive care needs.  Nutrition  Eat 5 or more servings of fruits and vegetables each day.  Try wheat bread, brown rice and whole grain pasta (instead of white bread, rice, and pasta).  Get enough calcium and vitamin D. Check the label on foods and aim for 100% of the RDA (recommended daily allowance).  Lifestyle  Exercise at least 150 minutes each week  (30 minutes a day, 5 days a week).  Do muscle strengthening activities 2 days a week. These help control your weight and prevent disease.  No smoking.  Wear sunscreen to prevent skin cancer.  Have a dental exam and cleaning every 6 months.  Yearly exams  See your health care team every year to talk about:  Any changes in your health.  Any medicines your care team has prescribed.  Preventive care, family planning, and ways to prevent chronic diseases.  Shots (vaccines)   HPV shots (up to age 26), if you've never had them before.  Hepatitis B shots (up to age 59), if you've never had them before.  COVID-19 shot: Get this shot when it's due.  Flu shot: Get a flu shot every year.  Tetanus shot: Get a tetanus shot every 10 years.  Pneumococcal, hepatitis A, and RSV shots: Ask your care team if you need these based on your risk.  Shingles shot (for age 50 and up)  General health tests  Diabetes screening:  Starting at age 35, Get screened for diabetes at least every 3 years.  If you are younger than age 35, ask your care team if you should be screened for diabetes.  Cholesterol test: At age 39, start having a cholesterol test every 5 years, or more often if advised.  Bone density scan (DEXA): At age 50, ask your care team if you should have this scan for osteoporosis (brittle bones).  Hepatitis C: Get tested at least once in your life.  STIs (sexually  transmitted infections)  Before age 24: Ask your care team if you should be screened for STIs.  After age 24: Get screened for STIs if you're at risk. You are at risk for STIs (including HIV) if:  You are sexually active with more than one person.  You don't use condoms every time.  You or a partner was diagnosed with a sexually transmitted infection.  If you are at risk for HIV, ask about PrEP medicine to prevent HIV.  Get tested for HIV at least once in your life, whether you are at risk for HIV or not.  Cancer screening tests  Cervical cancer screening: If you have a cervix, begin getting regular cervical cancer screening tests starting at age 21.  Breast cancer scan (mammogram): If you've ever had breasts, begin having regular mammograms starting at age 40. This is a scan to check for breast cancer.  Colon cancer screening: It is important to start screening for colon cancer at age 45.  Have a colonoscopy test every 10 years (or more often if you're at risk) Or, ask your provider about stool tests like a FIT test every year or Cologuard test every 3 years.  To learn more about your testing options, visit:   .  For help making a decision, visit:   https://bit.ly/nq89708.  Prostate cancer screening test: If you have a prostate, ask your care team if a prostate cancer screening test (PSA) at age 55 is right for you.  Lung cancer screening: If you are a current or former smoker ages 50 to 80, ask your care team if ongoing lung cancer screenings are right for you.  For informational purposes only. Not to replace the advice of your health care provider. Copyright   2023 [facility]. All rights reserved. Clinically reviewed by the [facility]. [employer] [id number] - REV 01/24.  Safer Sex: Care Instructions  Overview  Safer sex is a way to reduce your risk of getting a sexually transmitted infection (STI). It can also help prevent pregnancy.  Several products can help you practice  safer sex and reduce your chance of STIs. One of the best is a condom. There are internal and external condoms. You can use a special rubber sheet (dental dam) for protection during oral sex. Disposable gloves can keep your hands from touching blood, semen, or other body fluids that can carry infections.  Remember that birth control methods such as diaphragms, IUDs, foams, and birth control pills do not stop you from getting STIs.  Follow-up care is a key part of your treatment and safety. Be sure to make and go to all appointments, and call your doctor if you are having problems. It's also a good idea to know your test results and keep a list of the medicines you take.  How can you care for yourself at home?  Think about getting vaccinated to help prevent hepatitis A, hepatitis B, and human papillomavirus (HPV). They can be spread through sex.  Use a condom every time you have sex. Use an external condom, which goes on the penis. Or use an internal condom, which goes into the vagina or anus.  Make sure you use the right size external condom. A condom that's too small can break easily. A condom that's too big can slip off during sex.  Use a new condom each time you have sex. Be careful not to poke a hole in the condom when you open the wrapper.  Don't use an internal condom and an external condom at the same time.  Never use petroleum jelly (such as Vaseline), grease, hand lotion, baby oil, or anything with oil in it. These products can make holes in the condom.  After intercourse, hold the edge of the condom as you remove it. This will help keep semen from spilling out of the condom.  Do not have sex with anyone who has symptoms of an STI, such as sores on the genitals or mouth.  Do not drink a lot of alcohol or use drugs before sex.  Limit your sex partners. Sex with one partner who has sex only with you can reduce your risk of getting an STI.  Don't share sex toys. But if you do share them, use a condom and clean  "the sex toys between each use.  Talk to any partners before you have sex. Talk about what you feel comfortable with and whether you have any boundaries with sex. And find out if your partner or partners may be at risk for any STI. Keep in mind that a person may be able to spread an STI even if they do not have symptoms. You and any partners may want to get tested for STIs.  Where can you learn more?  Go to https://www.[employer].net/patiented  Enter B608 in the search box to learn more about \"Safer Sex: Care Instructions.\"  Current as of: November 27, 2023  Content Version: 14.2 2024 [employer].   Care instructions adapted under license by your healthcare professional. If you have questions about a medical condition or this instruction, always ask your healthcare professional. [employer] disclaims any warranty or liability for your use of this information.       "

## 2024-12-04 NOTE — PROGRESS NOTES
"Preventive Care Visit  [facility]  Phan West DO, Family Medicine  Dec 4, 2024  {Provider  Link to [facility] :[id number]}    Assessment & Plan     Routine general medical examination at a health care facility  - REVIEW OF HEALTH MAINTENANCE PROTOCOL ORDERS    Need for hepatitis C screening test  - Hepatitis C Screen Reflex to HCV RNA Quant and Genotype; Future    Hip pain, right  If not improved in 6 weeks, get MRI   - Physical Therapy  Referral; Future    Lipid screening  - Lipid panel reflex to direct LDL Fasting; Future    Screening for diabetes mellitus  - Hemoglobin A1c; Future    Need for vaccination  - COVID-19 12+ (PFIZER)  - INFLUENZA VACCINE,SPLIT VIRUS,TRIVALENT,PF(FLUZONE)    Routine screening for STI (sexually transmitted infection)  - Chlamydia trachomatis/Neisseria gonorrhoeae by PCR; Future  - Treponema Abs w Reflex to RPR and Titer; Future  - HIV Antigen Antibody Combo Cascade; Future    IUD check up  - Ob/Gyn  Referral; Future    BMI  Estimated body mass index is 38.32 kg/m  as calculated from the following:    Height as of this encounter: 1.715 m (5' 7.52\").    Weight as of this encounter: 112.7 kg (248 lb 8 oz).   Weight management plan: Discussed healthy diet and exercise guidelines    Counseling  Appropriate preventive services were addressed with this patient via screening, questionnaire, or discussion as appropriate for fall prevention, nutrition, physical activity, Tobacco-use cessation, social engagement, weight loss and cognition.  Checklist reviewing preventive services available has been given to the patient.  Reviewed patient's diet, addressing concerns and/or questions.   She is at risk for lack of exercise and has been provided with information to increase physical activity for the benefit of her well-being.       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 Good   Feel stress (tense, anxious, or unable to sleep) Only a little      (!) STRESS CONCERN      12/3/2024   Nutrition   Three or more servings of calcium each day? Yes   Diet: Regular (no restrictions)   How many servings of fruit and vegetables per day? (!) 2-3   How many sweetened beverages each day? 0-1            12/3/2024   Exercise   Days per week of moderate/strenous exercise 3 days   Average minutes spent exercising at this level 30 min            12/3/2024   Social Factors   Frequency of gathering with friends or relatives Twice a week   Worry food won't last until get money to buy more No   Food not last or not have enough money for food? No   Do you have housing? (Housing is defined as stable permanent housing and does not include staying ouside in a car, in a tent, in an abandoned building, in an overnight shelter, or couch-surfing.) Yes   Are you worried about losing your housing? No   Lack of transportation? No   Unable to get utilities (heat,electricity)? No            12/3/2024   Dental   Dentist two times every year? Yes            12/3/2024   TB Screening   Were you born outside of the [place]? No            Today's PHQ-2 Score:       12/3/2024     3:43 PM   PHQ-2 ( 1999 Pf[employer])   Q1: Little interest or pleasure in doing things 0    Q2: Feeling down, depressed or hopeless 0    PHQ-2 Score 0    Q1: Little interest or pleasure in doing things Not at all   Q2: Feeling down, depressed or hopeless Not at all   PHQ-2 Score 0       Patient-reported           12/3/2024   Substance Use   Alcohol more than 3/day or more than 7/wk No   Do you use any other substances recreationally? No        Social History     Tobacco Use    Smoking status: Never    Smokeless  "tobacco: Never   Vaping Use    Vaping status: Never Used   Substance Use Topics    Alcohol use: Yes     Comment: rare    Drug use: Never         12/3/2024   One time HIV Screening   Previous HIV test? I don't know          12/3/2024   STI Screening   New sexual partner(s) since last STI/HIV test? (!) YES         History of abnormal Pap smear: No - age 30-64 HPV with reflex Pap every 5 years recommended        11/8/2023     4:58 PM   PAP / HPV   PAP Negative for Intraepithelial Lesion or Malignancy (NILM)            12/3/2024   Contraception/Family Planning   Questions about contraception or family planning (!) YES needs new iud.  Not interested in getting pregnant.         {Provider  REQUIRED FOR [facility] Use the storyboard to review patient history, after sections have been marked as reviewed, refresh note to capture documentation:[id number]}   Reviewed and updated as needed this visit by Provider   Tobacco     Med Hx  Surg Hx  Fam Hx  Soc Hx Sexual Activity           Objective    Exam  BP (!) 145/89 (BP Location: Right arm, Patient Position: Sitting, Cuff Size: Adult Large)   Pulse 98   Temp 98.1  F (36.7  C) (Temporal)   Resp 16   Ht 1.715 m (5' 7.52\")   Wt 112.7 kg (248 lb 8 oz)   LMP  (LMP Unknown)   SpO2 99%   BMI 38.32 kg/m     Estimated body mass index is 38.32 kg/m  as calculated from the following:    Height as of this encounter: 1.715 m (5' 7.52\").    Weight as of this encounter: 112.7 kg (248 lb 8 oz).    Physical Exam  Constitutional:       General: She is not in acute distress.  HENT:      Head: Normocephalic.      Right Ear: Tympanic membrane normal.      Left Ear: Tympanic membrane normal.      Mouth/Throat:      Mouth: Mucous membranes are moist.      Pharynx: Oropharynx is clear.   Eyes:      General: No scleral icterus.  Cardiovascular:      Rate and Rhythm: Normal rate and regular rhythm.      Heart sounds: Normal heart sounds.   Pulmonary:      Effort: No respiratory distress.      Breath " sounds: Normal breath sounds.   Musculoskeletal:      Cervical back: No tenderness.      Right lower leg: No edema.      Left lower leg: No edema.      Comments: Melyssa 4 positive for anterior hip pain right.  Left normal    Lymphadenopathy:      Cervical: No cervical adenopathy.   Skin:     General: Skin is warm.   Neurological:      General: No focal deficit present.      Mental Status: She is alert.   Psychiatric:         Mood and Affect: Mood normal.         Behavior: Behavior normal.           Signed Electronically by: Phan West DO  {Email feedback regarding this note to [email or web address]   :[id number]}

## 2024-12-05 LAB
C TRACH DNA SPEC QL PROBE+SIG AMP: NEGATIVE
CHOLEST SERPL-MCNC: 186 MG/DL
FASTING STATUS PATIENT QL REPORTED: ABNORMAL
HCV AB SERPL QL IA: NONREACTIVE
HDLC SERPL-MCNC: 47 MG/DL
HIV 1+2 AB+HIV1 P24 AG SERPL QL IA: NONREACTIVE
LDLC SERPL CALC-MCNC: 123 MG/DL
N GONORRHOEA DNA SPEC QL NAA+PROBE: NEGATIVE
NONHDLC SERPL-MCNC: 139 MG/DL
TRIGL SERPL-MCNC: 81 MG/DL

## 2024-12-09 ENCOUNTER — OFFICE VISIT (OUTPATIENT)
Dept: OBGYN | Facility: CLINIC | Age: 29
End: 2024-12-09
Payer: COMMERCIAL

## 2024-12-09 VITALS
SYSTOLIC BLOOD PRESSURE: 141 MMHG | BODY MASS INDEX: 38.55 KG/M2 | DIASTOLIC BLOOD PRESSURE: 80 MMHG | HEART RATE: 88 BPM | WEIGHT: 250 LBS

## 2024-12-09 DIAGNOSIS — Z30.433 ENCOUNTER FOR REMOVAL AND REINSERTION OF INTRAUTERINE CONTRACEPTIVE DEVICE: Primary | ICD-10-CM

## 2024-12-09 LAB — HCG UR QL: NEGATIVE

## 2024-12-09 PROCEDURE — 58300 INSERT INTRAUTERINE DEVICE: CPT | Performed by: OBSTETRICS & GYNECOLOGY

## 2024-12-09 PROCEDURE — 58301 REMOVE INTRAUTERINE DEVICE: CPT | Performed by: OBSTETRICS & GYNECOLOGY

## 2024-12-09 PROCEDURE — 81025 URINE PREGNANCY TEST: CPT | Performed by: OBSTETRICS & GYNECOLOGY

## 2024-12-09 NOTE — PATIENT INSTRUCTIONS

## 2024-12-09 NOTE — PROGRESS NOTES
GYN Progress Note     CC: IUD removal and reinsertion     HISTORY OF PRESENT ILLNESS:    Suyapa Pang is a 29 year old  who presents for IUD removal and reinsertion. She had the Mirena IUD placed about 7.5 years ago and has done really with it for contraception. She occasionally will get spotting but no true periods. She did initially have some intense cramping after the initial IUD placement but then did well after that. She is not planning pregnancy for at least the next couple of years.      OB HISTORY:  OB History    Para Term  AB Living   0 0 0 0 0 0   SAB IAB Ectopic Multiple Live Births   0 0 0 0 0       GYN HISTORY:  She denies concerns for STI, just had GC/CT last week and negative   Pap smear UTD with NIL pap in 2023     PAST MEDICAL HISTORY:  No past medical history on file.         PAST SURGICAL HISTORY:  No past surgical history on file.       CURRENT MEDICATIONS:   Current Outpatient Medications   Medication Sig Dispense Refill    levonorgestrel (MIRENA) 52 MG (20 mcg/day) IUD 1 each by Intrauterine route once.              ALLERGIES:  Patient has no known allergies.         SOCIAL HISTORY:  Social History     Tobacco Use    Smoking status: Never     Passive exposure: Never    Smokeless tobacco: Never   Vaping Use    Vaping status: Never Used   Substance Use Topics    Alcohol use: Yes     Comment: rare    Drug use: Never            FAMILY HISTORY:  Family History   Problem Relation Age of Onset    No Known Problems Mother     No Known Problems Father     No Known Problems Brother     Lung Cancer Maternal Grandmother     No Known Problems Maternal Grandfather     Other - See Comments Paternal Grandmother         car accident    Other - See Comments Paternal Grandfather         car accident            REVIEW OF SYSTEMS:  See HPI        PHYSICAL EXAMINATION:  VS:BP (!) 141/80 (BP Location: Right arm, Patient Position: Sitting, Cuff Size: Adult Large)   Pulse 88   Wt 113.4 kg  (250 lb)   LMP  (LMP Unknown)   BMI 38.55 kg/m    Body mass index is 38.55 kg/m .    Mirena IUD Removal/re-insertion Procedure Note    After a discussion of her options for contraception, the patient was interested in a Mirena IUD. We reviewed the risks, benefits and alternatives. The patient was counseled that she may have irregular bleeding for the first 3-6 months. She understands that 70% of women are oligomenorrheic and 30-40% are amenorrheic within 2 years. The patient was informed that the Mirena is FDA approved for 8 years however the IUD can be removed at any time. We reviewed risks of uterine perforation and she was made aware that the risk of uterine perforation that might require abdominal surgery is 1/1,000 insertions. She understands that rate of expulsion in the first year of use is 2-10%. There is also a 1% risk of infection in the first 20 days after insertion. We reviewed that the IUD is a very effective form of birth control however IUDs do have a low risk of failure to prevent pregnancy. We reviewed that the of Mirena IUD is 5-11/1,000 women. In case of pregnancy after failure of IUD, the risk of ectopic pregnancy is increased. After thorough counseling regarding risks and benefits of the IUD, at which time the patient's questions were answered to her apparent satisfaction, consent for insertion was obtained.    A timeout was performed and the correct patient and procedure were verified.    A bimanual exam was performed and the uterus was found to be anteverted.  A speculum exam was performed and the cervix was visualized and prepped with betadine.  A paracervical block was performed using a total of 10 mL of 1% lidocaine. The IUD strings were grasped with a ring forceps and the IUD was removed with gentle traction, inspected and found to be intact. A single tooth tenaculum was placed on the anterior lip of the cervix.  The uterus sounded to 8cm.  The Mirena IUD was placed without difficulty  using standard insertion technique. The strings were trimmed to about 3 cm long. The patient tolerated the procedure well. EBL minimal.      Laboratory values:  Component      Latest Ref Rng 2024  12:02 PM   HCG Qual Urine      Negative  Negative        Imaging findings:        ASSESSMENT:  Suyapa Pang is a 29 year old  who presents for IUD removal and reinsertion       PLAN:  (Z30.433) Encounter for removal and reinsertion of intrauterine contraceptive device  (primary encounter diagnosis)  -The patient was instructed on checking for the IUD strings. The patient was counseled that if she is ever unable to feel strings to contact me to be seen and use condoms until we can ensure the IUD is in the proper location.  -The patient was instructed to call if she experiences heavy bleeding, abdominal pain, or any other new concerns. The patient voiced understanding.  Plan: levonorgestrel (MIRENA) 52 MG (20 mcg/day) IUD         1 each, levonorgestrel (MIRENA) 52 MG (20         mcg/day) IUD, INSERTION INTRAUTERINE DEVICE,         REMOVE INTRAUTERINE DEVICE      Dispo: RTC as needed   Chayito Bello MD